# Patient Record
Sex: FEMALE | Race: OTHER | Employment: UNEMPLOYED
[De-identification: names, ages, dates, MRNs, and addresses within clinical notes are randomized per-mention and may not be internally consistent; named-entity substitution may affect disease eponyms.]

---

## 2024-03-14 ENCOUNTER — HOSPITAL ENCOUNTER (OUTPATIENT)
Facility: HOSPITAL | Age: 44
Discharge: HOME | End: 2024-03-14

## 2024-03-14 DIAGNOSIS — M54.50 CHRONIC MIDLINE LOW BACK PAIN WITHOUT SCIATICA: ICD-10-CM

## 2024-03-14 DIAGNOSIS — G89.29 CHRONIC MIDLINE LOW BACK PAIN WITHOUT SCIATICA: ICD-10-CM

## 2024-03-14 DIAGNOSIS — M54.2 NECK PAIN: ICD-10-CM

## 2024-03-14 PROCEDURE — 72050 X-RAY EXAM NECK SPINE 4/5VWS: CPT

## 2024-03-14 PROCEDURE — 72100 X-RAY EXAM L-S SPINE 2/3 VWS: CPT

## 2024-03-28 ENCOUNTER — OFFICE VISIT (OUTPATIENT)
Age: 44
End: 2024-03-28

## 2024-03-28 VITALS
OXYGEN SATURATION: 99 % | WEIGHT: 109 LBS | HEART RATE: 81 BPM | TEMPERATURE: 97.7 F | DIASTOLIC BLOOD PRESSURE: 50 MMHG | BODY MASS INDEX: 25.23 KG/M2 | SYSTOLIC BLOOD PRESSURE: 112 MMHG

## 2024-03-28 DIAGNOSIS — G89.29 CHRONIC MIDLINE LOW BACK PAIN WITHOUT SCIATICA: ICD-10-CM

## 2024-03-28 DIAGNOSIS — M54.50 CHRONIC MIDLINE LOW BACK PAIN WITHOUT SCIATICA: ICD-10-CM

## 2024-03-28 DIAGNOSIS — R22.1 MASS OF NECK: Primary | ICD-10-CM

## 2024-03-28 PROCEDURE — 99213 OFFICE O/P EST LOW 20 MIN: CPT | Performed by: NURSE PRACTITIONER

## 2024-03-28 SDOH — SOCIAL STABILITY: SOCIAL NETWORK: ARE YOU MARRIED, WIDOWED, DIVORCED, SEPARATED, NEVER MARRIED, OR LIVING WITH A PARTNER?: WIDOWED

## 2024-03-28 SDOH — HEALTH STABILITY: MENTAL HEALTH: HOW OFTEN DO YOU HAVE A DRINK CONTAINING ALCOHOL?: NEVER

## 2024-03-28 SDOH — ECONOMIC STABILITY: TRANSPORTATION INSECURITY
IN THE PAST 12 MONTHS, HAS THE LACK OF TRANSPORTATION KEPT YOU FROM MEDICAL APPOINTMENTS OR FROM GETTING MEDICATIONS?: NO

## 2024-03-28 SDOH — SOCIAL STABILITY: SOCIAL NETWORK: HOW OFTEN DO YOU ATTENT MEETINGS OF THE CLUB OR ORGANIZATION YOU BELONG TO?: NEVER

## 2024-03-28 SDOH — SOCIAL STABILITY: SOCIAL NETWORK
DO YOU BELONG TO ANY CLUBS OR ORGANIZATIONS SUCH AS CHURCH GROUPS UNIONS, FRATERNAL OR ATHLETIC GROUPS, OR SCHOOL GROUPS?: NO

## 2024-03-28 SDOH — SOCIAL STABILITY: SOCIAL NETWORK
IN A TYPICAL WEEK, HOW MANY TIMES DO YOU TALK ON THE PHONE WITH FAMILY, FRIENDS, OR NEIGHBORS?: MORE THAN THREE TIMES A WEEK

## 2024-03-28 SDOH — HEALTH STABILITY: MENTAL HEALTH
STRESS IS WHEN SOMEONE FEELS TENSE, NERVOUS, ANXIOUS, OR CAN'T SLEEP AT NIGHT BECAUSE THEIR MIND IS TROUBLED. HOW STRESSED ARE YOU?: ONLY A LITTLE

## 2024-03-28 SDOH — HEALTH STABILITY: MENTAL HEALTH: HOW MANY STANDARD DRINKS CONTAINING ALCOHOL DO YOU HAVE ON A TYPICAL DAY?: PATIENT DOES NOT DRINK

## 2024-03-28 SDOH — SOCIAL STABILITY: SOCIAL NETWORK: HOW OFTEN DO YOU GET TOGETHER WITH FRIENDS OR RELATIVES?: MORE THAN THREE TIMES A WEEK

## 2024-03-28 SDOH — ECONOMIC STABILITY: TRANSPORTATION INSECURITY
IN THE PAST 12 MONTHS, HAS LACK OF TRANSPORTATION KEPT YOU FROM MEETINGS, WORK, OR FROM GETTING THINGS NEEDED FOR DAILY LIVING?: NO

## 2024-03-28 SDOH — HEALTH STABILITY: PHYSICAL HEALTH: ON AVERAGE, HOW MANY DAYS PER WEEK DO YOU ENGAGE IN MODERATE TO STRENUOUS EXERCISE (LIKE A BRISK WALK)?: 0 DAYS

## 2024-03-28 SDOH — ECONOMIC STABILITY: INCOME INSECURITY: HOW HARD IS IT FOR YOU TO PAY FOR THE VERY BASICS LIKE FOOD, HOUSING, MEDICAL CARE, AND HEATING?: VERY HARD

## 2024-03-28 SDOH — SOCIAL STABILITY: SOCIAL NETWORK: HOW OFTEN DO YOU ATTEND CHURCH OR RELIGIOUS SERVICES?: 1 TO 4 TIMES PER YEAR

## 2024-03-28 SDOH — HEALTH STABILITY: PHYSICAL HEALTH: ON AVERAGE, HOW MANY MINUTES DO YOU ENGAGE IN EXERCISE AT THIS LEVEL?: 0 MIN

## 2024-03-28 ASSESSMENT — PATIENT HEALTH QUESTIONNAIRE - PHQ9
SUM OF ALL RESPONSES TO PHQ QUESTIONS 1-9: 2
2. FEELING DOWN, DEPRESSED OR HOPELESS: SEVERAL DAYS
SUM OF ALL RESPONSES TO PHQ QUESTIONS 1-9: 2
SUM OF ALL RESPONSES TO PHQ QUESTIONS 1-9: 2
SUM OF ALL RESPONSES TO PHQ9 QUESTIONS 1 & 2: 2
1. LITTLE INTEREST OR PLEASURE IN DOING THINGS: SEVERAL DAYS
SUM OF ALL RESPONSES TO PHQ QUESTIONS 1-9: 2

## 2024-03-28 NOTE — PROGRESS NOTES
\"Have you been to the ER, urgent care clinic since your last visit?  Hospitalized since your last visit?\"    NO    “Have you seen or consulted any other health care providers outside of [facility] since your last visit?”    NO     “Have you had a pap smear?”    NO    No cervical cancer screening on file         “Have you had a colorectal cancer screening such as a colonoscopy/FIT/Cologuard?    NO    No colonoscopy on file  No cologuard on file  No FIT/FOBT on file   No flexible sigmoidoscopy on file         Click Here for Release of Records Request

## 2024-03-28 NOTE — PROGRESS NOTES
Nikki Joya seen at d/c, full name and  verified, given After visit Summary and reviewed today's visit with patient along with instructions on when it is recommended to come back.  Patient was assisted in scheduling her imaging appointment for her Ultrasound: Thyroid ordered today.   Patient was explained that these services are NOT free. Patient was explained the Financial Assistance process and that she might receive a bill. Patient was explained that once bill is received that she is to set up an appt with our O.W to start on the financial assistance application that is based on the patient's income.

## 2024-03-28 NOTE — PROGRESS NOTES
3/28/2024 : Cornelia  Nikki Joya (: 1980) is a 44 y.o. female,  established patient, here for evaluation of the following chief complaint(s):  Results (Patient is here for test results.) and Extremity Weakness (Patient reports falling x2 due to numbness and weakness on right leg.)     ASSESSMENT/PLAN: ultrasound to be scheduled.  I Will consult with Dr. Holguin and we'll contact her.  Below is the assessment and plan developed based on review of pertinent history, physical exam, labs, studies, and medications.  1. Mass of neck  -     US HEAD NECK SOFT TISSUE THYROID; Future  2. Chronic midline low back pain without sciatica    Return for I am consulting with Dr. Holguin.  Diffuse degenerative changes with grade 1 anterolisthesis at C6-7.  Neural foraminal stenosis bilaterally at C3-4 and on the left at C4-5. (Per x-rays)  Degenerative disc and facet changes at L4-5 and L5-S1. (Per x-rays)  Numbness is always on the right side.  Everything is happening on the right side - losing hearing on the right side.  Has sound.  SUBJECTIVE/OBJECTIVE:  HPI Diffuse degenerative changes with grade 1 anterolisthesis at C6-7.  Neural foraminal stenosis bilaterally at C3-4 and on the left at C4-5.  She was feeling numbed in her legs.  2 days ago and when she tried to step up she fell down.    Diffuse degenerative changes with grade 1 anterolisthesis at C6-7.  Neural foraminal stenosis bilaterally at C3-4 and on the left at C4-5.  No results found for any visits on 24.  Current Medications:   Current Outpatient Medications on File Prior to Visit   Medication Sig Dispense Refill   • traZODone (DESYREL) 50 MG tablet Take 1 tablet by mouth nightly as needed for Sleep 30 tablet 2   • albuterol sulfate HFA (VENTOLIN HFA) 108 (90 Base) MCG/ACT inhaler Inhale 2 puffs into the lungs every 6 hours as needed for Wheezing or Shortness of Breath 18 g 1     No current facility-administered medications on file prior

## 2024-04-11 ENCOUNTER — HOSPITAL ENCOUNTER (OUTPATIENT)
Facility: HOSPITAL | Age: 44
Discharge: HOME | End: 2024-04-11

## 2024-04-11 DIAGNOSIS — R22.1 MASS OF NECK: ICD-10-CM

## 2024-04-11 PROCEDURE — 76536 US EXAM OF HEAD AND NECK: CPT

## 2024-04-16 NOTE — RESULT ENCOUNTER NOTE
The neck mass is a lipoma.  If it is interfering with daily activities, we can refer her to a surgeon.

## 2024-04-30 ENCOUNTER — OFFICE VISIT (OUTPATIENT)
Age: 44
End: 2024-04-30

## 2024-04-30 DIAGNOSIS — Z71.89 COUNSELING AND COORDINATION OF CARE: Primary | ICD-10-CM

## 2024-04-30 NOTE — PROGRESS NOTES
Assisted patient with medical bills. Application was filled out. A new appointment was made for 5/7/24 to complete application. Pending Bank statements and support letter.   Medicaid screening has been successfully completed.

## 2024-05-07 ENCOUNTER — OFFICE VISIT (OUTPATIENT)
Age: 44
End: 2024-05-07

## 2024-05-07 DIAGNOSIS — Z71.89 COUNSELING AND COORDINATION OF CARE: Primary | ICD-10-CM

## 2024-05-07 NOTE — PROGRESS NOTES
BS Financial Assistance application has been completed. [facility] notarized support letter for the patient.   Patient will bring it to a Financial Counselor at the hospital.

## 2025-04-24 ENCOUNTER — HOSPITAL ENCOUNTER (OUTPATIENT)
Facility: HOSPITAL | Age: 45
Setting detail: SPECIMEN
Discharge: HOME | End: 2025-04-27

## 2025-04-24 ENCOUNTER — TELEPHONE (OUTPATIENT)
Age: 45
End: 2025-04-24

## 2025-04-24 DIAGNOSIS — R13.14 PHARYNGOESOPHAGEAL DYSPHAGIA: ICD-10-CM

## 2025-04-24 DIAGNOSIS — Z13.9 SCREENING DUE: ICD-10-CM

## 2025-04-24 LAB
BASOPHILS # BLD: 0.04 K/UL (ref 0–0.1)
BASOPHILS NFR BLD: 0.5 % (ref 0–1)
DIFFERENTIAL METHOD BLD: NORMAL
EOSINOPHIL # BLD: 0.12 K/UL (ref 0–0.4)
EOSINOPHIL NFR BLD: 1.5 % (ref 0–7)
ERYTHROCYTE [DISTWIDTH] IN BLOOD BY AUTOMATED COUNT: 12.4 % (ref 11.5–14.5)
HCT VFR BLD AUTO: 39.1 % (ref 35–47)
HGB BLD-MCNC: 13 G/DL (ref 11.5–16)
IMM GRANULOCYTES # BLD AUTO: 0.03 K/UL (ref 0–0.04)
IMM GRANULOCYTES NFR BLD AUTO: 0.4 % (ref 0–0.5)
LYMPHOCYTES # BLD: 2.67 K/UL (ref 0.8–3.5)
LYMPHOCYTES NFR BLD: 32.8 % (ref 12–49)
MCH RBC QN AUTO: 30.3 PG (ref 26–34)
MCHC RBC AUTO-ENTMCNC: 33.2 G/DL (ref 30–36.5)
MCV RBC AUTO: 91.1 FL (ref 80–99)
MONOCYTES # BLD: 0.55 K/UL (ref 0–1)
MONOCYTES NFR BLD: 6.7 % (ref 5–13)
NEUTS SEG # BLD: 4.74 K/UL (ref 1.8–8)
NEUTS SEG NFR BLD: 58.1 % (ref 32–75)
NRBC # BLD: 0 K/UL (ref 0–0.01)
NRBC BLD-RTO: 0 PER 100 WBC
PLATELET # BLD AUTO: 327 K/UL (ref 150–400)
PMV BLD AUTO: 11.7 FL (ref 8.9–12.9)
RBC # BLD AUTO: 4.29 M/UL (ref 3.8–5.2)
WBC # BLD AUTO: 8.2 K/UL (ref 3.6–11)

## 2025-04-24 PROCEDURE — 36415 COLL VENOUS BLD VENIPUNCTURE: CPT

## 2025-04-24 PROCEDURE — 80053 COMPREHEN METABOLIC PANEL: CPT

## 2025-04-24 PROCEDURE — 83690 ASSAY OF LIPASE: CPT

## 2025-04-24 PROCEDURE — 85025 COMPLETE CBC W/AUTO DIFF WBC: CPT

## 2025-04-24 PROCEDURE — 80061 LIPID PANEL: CPT

## 2025-04-24 NOTE — TELEPHONE ENCOUNTER
Received call from Dr. Glez office who had patient with them in order to schedule appt. Was able to schedule appt with Dr. Golden for 05/08/2025, patient was also made aware of $100 self pay rate due at the time of the visit. Patient stated she will be applying for the care card once she receives bill from our office.

## 2025-04-24 NOTE — TELEPHONE ENCOUNTER
Placed outbound call to patient via  Services in regards to referral received. Patient did not answer, LVM requesting call back to schedule.

## 2025-04-25 ENCOUNTER — LAB (OUTPATIENT)
Age: 45
End: 2025-04-25

## 2025-04-25 ENCOUNTER — RESULTS FOLLOW-UP (OUTPATIENT)
Age: 45
End: 2025-04-25

## 2025-04-25 ENCOUNTER — HOSPITAL ENCOUNTER (OUTPATIENT)
Facility: HOSPITAL | Age: 45
Setting detail: SPECIMEN
Discharge: HOME | End: 2025-04-28

## 2025-04-25 DIAGNOSIS — K21.00 GASTROESOPHAGEAL REFLUX DISEASE WITH ESOPHAGITIS WITHOUT HEMORRHAGE: Primary | ICD-10-CM

## 2025-04-25 DIAGNOSIS — R13.10 DYSPHAGIA, UNSPECIFIED TYPE: ICD-10-CM

## 2025-04-25 DIAGNOSIS — K21.00 GASTROESOPHAGEAL REFLUX DISEASE WITH ESOPHAGITIS WITHOUT HEMORRHAGE: ICD-10-CM

## 2025-04-25 LAB
ALBUMIN SERPL-MCNC: 3.7 G/DL (ref 3.5–5)
ALBUMIN/GLOB SERPL: 0.9 (ref 1.1–2.2)
ALP SERPL-CCNC: 139 U/L (ref 45–117)
ALT SERPL-CCNC: 38 U/L (ref 12–78)
ANION GAP SERPL CALC-SCNC: 6 MMOL/L (ref 2–12)
AST SERPL-CCNC: 23 U/L (ref 15–37)
BILIRUB SERPL-MCNC: 0.2 MG/DL (ref 0.2–1)
BUN SERPL-MCNC: 11 MG/DL (ref 6–20)
BUN/CREAT SERPL: 21 (ref 12–20)
CALCIUM SERPL-MCNC: 9.6 MG/DL (ref 8.5–10.1)
CHLORIDE SERPL-SCNC: 105 MMOL/L (ref 97–108)
CHOLEST SERPL-MCNC: 214 MG/DL
CO2 SERPL-SCNC: 28 MMOL/L (ref 21–32)
CREAT SERPL-MCNC: 0.52 MG/DL (ref 0.55–1.02)
GLOBULIN SER CALC-MCNC: 3.9 G/DL (ref 2–4)
GLUCOSE SERPL-MCNC: 98 MG/DL (ref 65–100)
HDLC SERPL-MCNC: 51 MG/DL
HDLC SERPL: 4.2 (ref 0–5)
LDLC SERPL CALC-MCNC: 124.8 MG/DL (ref 0–100)
LIPASE SERPL-CCNC: 38 U/L (ref 13–75)
POTASSIUM SERPL-SCNC: 4.2 MMOL/L (ref 3.5–5.1)
PROT SERPL-MCNC: 7.6 G/DL (ref 6.4–8.2)
SODIUM SERPL-SCNC: 139 MMOL/L (ref 136–145)
TRIGL SERPL-MCNC: 191 MG/DL
VLDLC SERPL CALC-MCNC: 38.2 MG/DL

## 2025-04-25 PROCEDURE — 87338 HPYLORI STOOL AG IA: CPT

## 2025-04-25 NOTE — RESULT ENCOUNTER NOTE
Please let the pt know that her LDL cholesterol and triglycerides are a little above normal.  She does not need medication but It is important that she focus on a healthy diet - choosing mostly vegetables, lean meats (like chicken or fish) and whole grains.  He should limit sugars/sweets/sodas and simple carbs like white bread, rice, tortillas and pasta.  Avoid fried and greasy foods.    The rest of her labs are normal.  Normal liver, kidney and pancreas function.  Normal blood cells.

## 2025-04-30 ENCOUNTER — RESULTS FOLLOW-UP (OUTPATIENT)
Age: 45
End: 2025-04-30

## 2025-04-30 ENCOUNTER — PROCEDURE VISIT (OUTPATIENT)
Age: 45
End: 2025-04-30

## 2025-04-30 DIAGNOSIS — H90.3 SENSORINEURAL HEARING LOSS (SNHL), BILATERAL: Primary | ICD-10-CM

## 2025-04-30 DIAGNOSIS — H93.13 TINNITUS, BILATERAL: ICD-10-CM

## 2025-04-30 LAB
H PYLORI AG STL QL IA: NEGATIVE
SPECIMEN SOURCE: NORMAL

## 2025-04-30 PROCEDURE — 92557 COMPREHENSIVE HEARING TEST: CPT

## 2025-04-30 PROCEDURE — 92567 TYMPANOMETRY: CPT

## 2025-04-30 NOTE — PATIENT INSTRUCTIONS
the word \"Thursday\" but not the rest of the week, you may ask \"What was that about Thursday?\" or \"What did you want to do Thursday?\".  This shows the person talking that you are listening and will help them better explain what they are saying.  Be an advocate for yourself.  If you are hearing but not understanding, tell the other person \"I can hear you, but I need you to slow down when you speak.\"  Or if someone is facing the other direction, say \"I cannot hear you when you are not looking at me when we talk.\"       Tips as a Talker:   - Sit or stand 3 to 6 feet away to maximize audibility         -- It is unrealistic to believe someone else will fully hear your message if you are speaking from across the room or in a different room in the house   - Stay at eye level to help with visual cues   - Make sure you have the person’s attention before speaking   - Use facial expressions and gestures to accentuate your message   - Raise your voice slightly (do not scream)   - Speak slowly and distinctly   - Use short, simple sentences   - Rephrase your words if the person is having a hard time understanding you    - To avoid distortion, don’t speak directly into a person’s ear      Some additional items that may be helpful:   - Remain patient - this is important for both parties   - Write down items that still cannot be heard/understood.  You may write with pen/paper or consider typing/texting on a cell phone or smart device.   - If background noise is unavoidable, try to keep yourself in a good position in the room.  By sitting at a fofana on the side of the restaurant (preferably a corner), it will be easier to communicate than if you were sitting at a table in the middle with background noise surrounding you.  Keep yourself positioned away from music speakers or heavy foot traffic.

## 2025-04-30 NOTE — RESULT ENCOUNTER NOTE
Please let the pt know that her H.Pylori test is negative.  If she is still having reflux symptoms I can send pantoprazole.

## 2025-04-30 NOTE — PROGRESS NOTES
Nikki Joya   1980, 45 y.o. female   [id number]       AUDIOLOGIC EVALUATION      Nikki Joya is a 45 y.o. female seen today, 4/30/2025, for an audiologic evaluation. This appointment was completed with interpretation.    PERTINENT MEDICAL HISTORY:      Nikki Joya reports constant, bilateral tinnitus, louder in the right ear, over the past 3-4 years. She notes it makes communication difficult. She reports having pain and otorrhea approximately one year ago that has since subsided. She notes she had an accident involving her right ear in 2006 and is worried that tympanic membrane damage is contributing to her tinnitus.    She denied dizziness    IMPRESSIONS:      Today's results revealed mild sloping to moderate/moderately-severe sensorineural hearing loss. Tympanometry indicates normal movement of the tympanic membrane, consistent with middle ear function.    Follow medical recommendations of primary care physician and referring provider.    ASSESSMENT AND FINDINGS:     Otoscopy unremarkable.    RIGHT EAR:  Hearing Sensitivity: Mild sloping to moderately-severe sensorineural hearing loss. Asymmetric hearing with the right ear poorer than the left ear from 4000 to 8000 Hz  Speech Detection Threshold: 40 dB HL  Word Recognition: Due to interpretation issue/miscommunication, this test was not adequately completed  Tympanometry: Type A, consistent with normal middle ear function      LEFT EAR:  Hearing Sensitivity: Mild sloping to moderate sensorineural hearing loss  Speech Detection Threshold: 40 dB HL  Word Recognition: Due to interpretation issue/miscommunication, this test was not adequately completed  Tympanometry: Type A, consistent with normal middle ear function      Reliability: Fair (Could not mask due to interpretation issue)  Transducer: Inserts    See scanned audiogram dated 4/30/2025 for results.        PATIENT EDUCATION:     The following items were

## 2025-05-06 ENCOUNTER — HOSPITAL ENCOUNTER (OUTPATIENT)
Facility: HOSPITAL | Age: 45
Discharge: HOME | End: 2025-05-09
Attending: FAMILY MEDICINE

## 2025-05-06 DIAGNOSIS — K21.00 GASTROESOPHAGEAL REFLUX DISEASE WITH ESOPHAGITIS WITHOUT HEMORRHAGE: ICD-10-CM

## 2025-05-06 DIAGNOSIS — R13.10 DYSPHAGIA, UNSPECIFIED TYPE: ICD-10-CM

## 2025-05-06 PROCEDURE — 74221 X-RAY XM ESOPHAGUS 2CNTRST: CPT

## 2025-05-07 ENCOUNTER — RESULTS FOLLOW-UP (OUTPATIENT)
Age: 45
End: 2025-05-07

## 2025-05-07 DIAGNOSIS — K22.89 ESOPHAGEAL MASS: ICD-10-CM

## 2025-05-07 DIAGNOSIS — R13.10 DYSPHAGIA, UNSPECIFIED TYPE: Primary | ICD-10-CM

## 2025-05-07 NOTE — RESULT ENCOUNTER NOTE
Spoke with patient and reviewed Esophagram.  Shows mass which needs direct visualization.  Discussed starting process to see GI through [facility], will make referral.  Geraldo Holguin MD

## 2025-05-08 ENCOUNTER — OFFICE VISIT (OUTPATIENT)
Age: 45
End: 2025-05-08

## 2025-05-08 VITALS
HEIGHT: 60 IN | TEMPERATURE: 98.3 F | RESPIRATION RATE: 18 BRPM | SYSTOLIC BLOOD PRESSURE: 110 MMHG | DIASTOLIC BLOOD PRESSURE: 69 MMHG | HEART RATE: 81 BPM | WEIGHT: 105.4 LBS | OXYGEN SATURATION: 96 % | BODY MASS INDEX: 20.69 KG/M2

## 2025-05-08 DIAGNOSIS — D17.0 LIPOMA OF NECK: Primary | ICD-10-CM

## 2025-05-08 PROCEDURE — 99203 OFFICE O/P NEW LOW 30 MIN: CPT | Performed by: SURGERY

## 2025-05-08 RX ORDER — SODIUM CHLORIDE 0.9 % (FLUSH) 0.9 %
5-40 SYRINGE (ML) INJECTION EVERY 12 HOURS SCHEDULED
OUTPATIENT
Start: 2025-05-08

## 2025-05-08 RX ORDER — SODIUM CHLORIDE 9 MG/ML
INJECTION, SOLUTION INTRAVENOUS PRN
OUTPATIENT
Start: 2025-05-08

## 2025-05-08 RX ORDER — SODIUM CHLORIDE 0.9 % (FLUSH) 0.9 %
5-40 SYRINGE (ML) INJECTION PRN
OUTPATIENT
Start: 2025-05-08

## 2025-05-08 ASSESSMENT — PATIENT HEALTH QUESTIONNAIRE - PHQ9
SUM OF ALL RESPONSES TO PHQ QUESTIONS 1-9: 1
2. FEELING DOWN, DEPRESSED OR HOPELESS: SEVERAL DAYS
1. LITTLE INTEREST OR PLEASURE IN DOING THINGS: NOT AT ALL
SUM OF ALL RESPONSES TO PHQ QUESTIONS 1-9: 1

## 2025-05-08 NOTE — PROGRESS NOTES
Identified pt with two pt identifiers (name and ). Reviewed chart in preparation for visit and have obtained necessary documentation.    [facility] Language Services    Session Code:    Language:Telugu     Name:Jess     ID:[id number]      Nikki Joya is a 45 y.o. female  Chief Complaint   Patient presents with    New Patient    Mass     Lipoma of the neck     /69 (BP Site: Left Upper Arm, Patient Position: Sitting, BP Cuff Size: Small Adult)   Pulse 81   Temp 98.3 °F (36.8 °C) (Oral)   Resp 18   Ht 1.52 m (4' 11.84\")   Wt 47.8 kg (105 lb 6.4 oz)   SpO2 96%   BMI 20.69 kg/m²     1. Have you been to the ER, urgent care clinic since your last visit?  Hospitalized since your last visit?no    2. Have you seen or consulted any other health care providers outside of the [facility] since your last visit?  Include any pap smears or colon screening. no

## 2025-05-08 NOTE — PROGRESS NOTES
Surgery Progress Note    5/8/2025    had concerns including New Patient and Mass (Lipoma of the neck).     Subjective:     Patient is a 45 y.o. female who reports she has had a soft mass on the right back neck area for many years.  It has been increasing slowly in size.  Denies the area ever getting infected, denies drainage.  The mass is now a significant size and she would like to have it removed.  Denies any similar masses anywhere else.      Constitutional: No fever or chills  Neurologic: No headache  Eyes: No scleral icterus or irritated eyes  Nose: No nasal pain or drainage  Mouth: No oral lesions or sore throat  Cardiac: No palpations or chest pain  Pulmonary: No cough or shortness of breath  Gastrointestinal: No nausea, emesis, diarrhea, or constipation  Genitourinary: No dysuria  Musculoskeletal: No muscle or joint tenderness  Skin: No rashes or lesions  Psychiatric: No anxiety or depressed mood    Current Outpatient Medications   Medication Instructions    albuterol sulfate HFA (VENTOLIN HFA) 108 (90 Base) MCG/ACT inhaler 2 puffs, Inhalation, EVERY 6 HOURS PRN    traZODone (DESYREL) 50 mg, Oral, NIGHTLY PRN       Allergies   Allergen Reactions    Penicillins Rash        Past Medical History:   Diagnosis Date    Asthma     Back pain     GERD (gastroesophageal reflux disease)         No past surgical history on file.     Social History     Socioeconomic History    Marital status: Single   Tobacco Use    Smoking status: Never     Passive exposure: Never    Smokeless tobacco: Never   Substance and Sexual Activity    Alcohol use: Never    Drug use: Never     Social Drivers of Health     Financial Resource Strain: High Risk (3/28/2024)    Overall Financial Resource Strain (CARDIA)     Difficulty of Paying Living Expenses: Very hard   Food Insecurity: No Food Insecurity (4/24/2025)    Hunger Vital Sign     Worried About Running Out of Food in the Last Year: Never true     Ran Out of Food in the Last Year: Never

## 2025-05-09 ENCOUNTER — TELEPHONE (OUTPATIENT)
Age: 45
End: 2025-05-09

## 2025-05-09 NOTE — TELEPHONE ENCOUNTER
Called PT via  services, no answer. LVM via  services for PT to call back and schedule surgery with Dr. Golden.

## 2025-05-12 ENCOUNTER — PREP FOR PROCEDURE (OUTPATIENT)
Age: 45
End: 2025-05-12

## 2025-05-12 DIAGNOSIS — D17.0 LIPOMA OF NECK: ICD-10-CM

## 2025-05-14 ENCOUNTER — TELEPHONE (OUTPATIENT)
Age: 45
End: 2025-05-14

## 2025-05-14 NOTE — TELEPHONE ENCOUNTER
Outbound call to patient using . Patient's name and  was verified. Per Sandro's request, the appointment for  @ 9am was rescheduled to 1pm with Charles Fletcher. Patient was reminded this was for a post op and not a procedure. Patient understood.

## 2025-05-19 ENCOUNTER — HOSPITAL ENCOUNTER (OUTPATIENT)
Facility: HOSPITAL | Age: 45
Setting detail: OUTPATIENT SURGERY
Discharge: HOME | End: 2025-05-19
Attending: SURGERY | Admitting: SURGERY

## 2025-05-19 ENCOUNTER — ANESTHESIA EVENT (OUTPATIENT)
Facility: HOSPITAL | Age: 45
End: 2025-05-19

## 2025-05-19 ENCOUNTER — ANESTHESIA (OUTPATIENT)
Facility: HOSPITAL | Age: 45
End: 2025-05-19

## 2025-05-19 VITALS
RESPIRATION RATE: 16 BRPM | HEART RATE: 80 BPM | WEIGHT: 105.16 LBS | OXYGEN SATURATION: 100 % | DIASTOLIC BLOOD PRESSURE: 72 MMHG | BODY MASS INDEX: 20.65 KG/M2 | TEMPERATURE: 98.5 F | SYSTOLIC BLOOD PRESSURE: 121 MMHG

## 2025-05-19 PROCEDURE — 3600000002 HC SURGERY LEVEL 2 BASE: Performed by: SURGERY

## 2025-05-19 PROCEDURE — 3700000001 HC ADD 15 MINUTES (ANESTHESIA): Performed by: SURGERY

## 2025-05-19 PROCEDURE — 3700000000 HC ANESTHESIA ATTENDED CARE: Performed by: SURGERY

## 2025-05-19 PROCEDURE — 3600000012 HC SURGERY LEVEL 2 ADDTL 15MIN: Performed by: SURGERY

## 2025-05-19 PROCEDURE — 6360000002 HC RX W HCPCS: Performed by: NURSE ANESTHETIST, CERTIFIED REGISTERED

## 2025-05-19 PROCEDURE — 7100000010 HC PHASE II RECOVERY - FIRST 15 MIN: Performed by: SURGERY

## 2025-05-19 PROCEDURE — 2709999900 HC NON-CHARGEABLE SUPPLY: Performed by: SURGERY

## 2025-05-19 PROCEDURE — 7100000000 HC PACU RECOVERY - FIRST 15 MIN: Performed by: SURGERY

## 2025-05-19 PROCEDURE — 7100000011 HC PHASE II RECOVERY - ADDTL 15 MIN: Performed by: SURGERY

## 2025-05-19 PROCEDURE — 21554 EXC NECK TUM DEEP 5 CM/>: CPT | Performed by: SURGERY

## 2025-05-19 PROCEDURE — 2500000003 HC RX 250 WO HCPCS: Performed by: NURSE ANESTHETIST, CERTIFIED REGISTERED

## 2025-05-19 PROCEDURE — 88304 TISSUE EXAM BY PATHOLOGIST: CPT

## 2025-05-19 PROCEDURE — 6360000002 HC RX W HCPCS: Performed by: SURGERY

## 2025-05-19 PROCEDURE — 2580000003 HC RX 258: Performed by: STUDENT IN AN ORGANIZED HEALTH CARE EDUCATION/TRAINING PROGRAM

## 2025-05-19 RX ORDER — ONDANSETRON 2 MG/ML
4 INJECTION INTRAMUSCULAR; INTRAVENOUS
Status: DISCONTINUED | OUTPATIENT
Start: 2025-05-19 | End: 2025-05-19 | Stop reason: HOSPADM

## 2025-05-19 RX ORDER — SODIUM CHLORIDE 0.9 % (FLUSH) 0.9 %
5-40 SYRINGE (ML) INJECTION EVERY 12 HOURS SCHEDULED
Status: DISCONTINUED | OUTPATIENT
Start: 2025-05-19 | End: 2025-05-19 | Stop reason: HOSPADM

## 2025-05-19 RX ORDER — DIPHENHYDRAMINE HYDROCHLORIDE 50 MG/ML
12.5 INJECTION, SOLUTION INTRAMUSCULAR; INTRAVENOUS
Status: DISCONTINUED | OUTPATIENT
Start: 2025-05-19 | End: 2025-05-19 | Stop reason: HOSPADM

## 2025-05-19 RX ORDER — FENTANYL CITRATE 50 UG/ML
INJECTION, SOLUTION INTRAMUSCULAR; INTRAVENOUS
Status: DISCONTINUED | OUTPATIENT
Start: 2025-05-19 | End: 2025-05-19 | Stop reason: SDUPTHER

## 2025-05-19 RX ORDER — EPHEDRINE SULFATE/0.9% NACL/PF 25 MG/5 ML
SYRINGE (ML) INTRAVENOUS
Status: DISCONTINUED | OUTPATIENT
Start: 2025-05-19 | End: 2025-05-19 | Stop reason: SDUPTHER

## 2025-05-19 RX ORDER — IBUPROFEN 800 MG/1
800 TABLET, FILM COATED ORAL EVERY 6 HOURS PRN
COMMUNITY

## 2025-05-19 RX ORDER — MIDAZOLAM HYDROCHLORIDE 1 MG/ML
INJECTION, SOLUTION INTRAMUSCULAR; INTRAVENOUS
Status: DISCONTINUED | OUTPATIENT
Start: 2025-05-19 | End: 2025-05-19 | Stop reason: SDUPTHER

## 2025-05-19 RX ORDER — NALOXONE HYDROCHLORIDE 0.4 MG/ML
INJECTION, SOLUTION INTRAMUSCULAR; INTRAVENOUS; SUBCUTANEOUS PRN
Status: DISCONTINUED | OUTPATIENT
Start: 2025-05-19 | End: 2025-05-19 | Stop reason: HOSPADM

## 2025-05-19 RX ORDER — MIDAZOLAM HYDROCHLORIDE 2 MG/2ML
2 INJECTION, SOLUTION INTRAMUSCULAR; INTRAVENOUS
Status: DISCONTINUED | OUTPATIENT
Start: 2025-05-19 | End: 2025-05-19 | Stop reason: HOSPADM

## 2025-05-19 RX ORDER — FENTANYL CITRATE 50 UG/ML
100 INJECTION, SOLUTION INTRAMUSCULAR; INTRAVENOUS
Status: DISCONTINUED | OUTPATIENT
Start: 2025-05-19 | End: 2025-05-19 | Stop reason: HOSPADM

## 2025-05-19 RX ORDER — SODIUM CHLORIDE 9 MG/ML
INJECTION, SOLUTION INTRAVENOUS PRN
Status: DISCONTINUED | OUTPATIENT
Start: 2025-05-19 | End: 2025-05-19 | Stop reason: HOSPADM

## 2025-05-19 RX ORDER — SODIUM CHLORIDE, SODIUM LACTATE, POTASSIUM CHLORIDE, CALCIUM CHLORIDE 600; 310; 30; 20 MG/100ML; MG/100ML; MG/100ML; MG/100ML
INJECTION, SOLUTION INTRAVENOUS CONTINUOUS
Status: DISCONTINUED | OUTPATIENT
Start: 2025-05-19 | End: 2025-05-19 | Stop reason: HOSPADM

## 2025-05-19 RX ORDER — PROPOFOL 10 MG/ML
INJECTION, EMULSION INTRAVENOUS
Status: DISCONTINUED | OUTPATIENT
Start: 2025-05-19 | End: 2025-05-19 | Stop reason: SDUPTHER

## 2025-05-19 RX ORDER — SODIUM CHLORIDE 0.9 % (FLUSH) 0.9 %
5-40 SYRINGE (ML) INJECTION PRN
Status: DISCONTINUED | OUTPATIENT
Start: 2025-05-19 | End: 2025-05-19 | Stop reason: HOSPADM

## 2025-05-19 RX ORDER — LIDOCAINE HYDROCHLORIDE 10 MG/ML
1 INJECTION, SOLUTION EPIDURAL; INFILTRATION; INTRACAUDAL; PERINEURAL
Status: DISCONTINUED | OUTPATIENT
Start: 2025-05-19 | End: 2025-05-19 | Stop reason: HOSPADM

## 2025-05-19 RX ADMIN — PROPOFOL 30 MG: 10 INJECTION, EMULSION INTRAVENOUS at 09:32

## 2025-05-19 RX ADMIN — PROPOFOL 100 MCG/KG/MIN: 10 INJECTION, EMULSION INTRAVENOUS at 09:33

## 2025-05-19 RX ADMIN — FENTANYL CITRATE 50 MCG: 50 INJECTION, SOLUTION INTRAMUSCULAR; INTRAVENOUS at 09:32

## 2025-05-19 RX ADMIN — EPHEDRINE SULFATE 5 MG: 5 INJECTION INTRAVENOUS at 09:37

## 2025-05-19 RX ADMIN — FENTANYL CITRATE 50 MCG: 50 INJECTION, SOLUTION INTRAMUSCULAR; INTRAVENOUS at 09:27

## 2025-05-19 RX ADMIN — MIDAZOLAM HYDROCHLORIDE 2 MG: 1 INJECTION, SOLUTION INTRAMUSCULAR; INTRAVENOUS at 09:27

## 2025-05-19 RX ADMIN — SODIUM CHLORIDE, POTASSIUM CHLORIDE, SODIUM LACTATE AND CALCIUM CHLORIDE: 600; 310; 30; 20 INJECTION, SOLUTION INTRAVENOUS at 09:07

## 2025-05-19 ASSESSMENT — PAIN - FUNCTIONAL ASSESSMENT: PAIN_FUNCTIONAL_ASSESSMENT: 0-10

## 2025-05-19 NOTE — OP NOTE
OPERATIVE REPORT - Excision back neck lipoma    PREOPERATIVE DIAGNOSIS: back neck lipoma    POSTOPERATIVE DIAGNOSIS: Same    OPERATIVE PROCEDURE:  Excision back neck lipoma      SURGEON: Zo Golden MD    ANESTHESIA: MAC with local    COMPLICATIONS: None    ESTIMATED BLOOD LOSS: Minimal.    INDICATION: Documented in the history and physical.    FINDINGS: 4cm x 5cm back neck lipoma    PROCEDURE:   The patient was taken to the operating room and placed in the left side down position and following MAC    the back neck was prepped and draped in the usual sterile fashion.  Local anesthetic was injected to the area.   A transverese incision was made over the area where mass was palpated.  This was carried down the subcutaneous tissues with a bovie cautery.  Mass was grapsed and using cautery and retraction, the mass was excised.  Hemostasis obtained using cautery. 4cm x 5cm mass excised.     Deep dermal stitches were taken with 3-0 Vicryl.   The skin was approximated with a running 4-0 Vicryl subcuticular suture. Dermabond was applied as a dressing. The patient tolerated the procedure well, and transferred to the recovery room in satisfactory condition.        SPECIMEN:back neck lipoma    COUNTS: Correct at the completion of surgery.    DRAIN: NONE    Zo Golden MD

## 2025-05-19 NOTE — ANESTHESIA POSTPROCEDURE EVALUATION
Department of Anesthesiology  Postprocedure Note    Patient: Nikki Joya  MRN: [id number]  YOB: 1980  Date of evaluation: 5/19/2025    Procedure Summary       Date: 05/19/25 Room / Location: [facility] MAIN OR 6 / [facility] MAIN OR    Anesthesia Start: 0927 Anesthesia Stop: 1018    Procedure: EXCISION BACK NECK LIPOMA (MAC/LOCAL) (Neck) Diagnosis:       Lipoma of neck      (Lipoma of neck [D17.0])    Surgeons: Zo Golden MD Responsible Provider: Alexa Anderson MD    Anesthesia Type: MAC ASA Status: 2            Anesthesia Type: No value filed.    Elizabeth Phase I: Elizabeth Score: 9    Elizabeth Phase II: Elizabeth Score: 10    Anesthesia Post Evaluation    Patient location during evaluation: PACU  Patient participation: complete - patient participated  Level of consciousness: awake  Airway patency: patent  Nausea & Vomiting: no vomiting and no nausea  Cardiovascular status: hemodynamically stable  Respiratory status: acceptable  Hydration status: stable  Pain management: adequate    No notable events documented.

## 2025-05-19 NOTE — BRIEF OP NOTE
Brief Postoperative Note      Patient: Nikki Joya  YOB: 1980  MRN: [id number]    Date of Procedure: 5/19/2025    Pre-Op Diagnosis Codes:      * Lipoma of neck [D17.0]    Post-Op Diagnosis: Same       Procedure(s):  EXCISION BACK NECK LIPOMA (MAC/LOCAL)    Surgeon(s):  Zo Golden MD    Assistant:  Surgical Assistant: Jaqueline Dias; Can Presley    Anesthesia: Monitor Anesthesia Care    Estimated Blood Loss (mL): Minimal    Complications: None    Specimens:   ID Type Source Tests Collected by Time Destination   1 : Neck lipoma Tissue Neck SURGICAL PATHOLOGY Zo Golden MD 5/19/2025 0954        Implants:  none      Drains:none    Findings:  Infection Present At Time Of Surgery (PATOS) (choose all levels that have infection present):  No infection present  Other Findings: 4cm x 5cm back neck lipoma  This procedure was not performed to treat primary cutaneous melanoma through wide local excision    Electronically signed by Zo Golden MD on 5/19/2025 at 10:08 AM

## 2025-05-19 NOTE — ANESTHESIA PRE PROCEDURE
Department of Anesthesiology  Preprocedure Note       Name:  Nikki Joya   Age:  45 y.o.  :  1980                                          MRN:  [id number]         Date:  2025      Surgeon: Surgeon(s):  Zo Golden MD    Procedure: Procedure(s):  EXCISION BACK NECK LIPOMA (MAC/LOCAL)    Medications prior to admission:   Prior to Admission medications    Medication Sig Start Date End Date Taking? Authorizing Provider   ibuprofen (ADVIL;MOTRIN) 800 MG tablet Take 1 tablet by mouth every 6 hours as needed for Pain   Yes Provider, MD Denise   traZODone (DESYREL) 50 MG tablet Take 1 tablet by mouth nightly as needed for Sleep 24  Yes Nakul Crow MD   albuterol sulfate HFA (VENTOLIN HFA) 108 (90 Base) MCG/ACT inhaler Inhale 2 puffs into the lungs every 6 hours as needed for Wheezing or Shortness of Breath 24  Yes Nakul Crow MD       Current medications:    Current Facility-Administered Medications   Medication Dose Route Frequency Provider Last Rate Last Admin   • lidocaine PF 1 % injection 1 mL  1 mL IntraDERmal Once PRN Rui Carver MD       • fentaNYL (SUBLIMAZE) injection 100 mcg  100 mcg IntraVENous Once PRN Rui Carver MD       • lactated ringers infusion   IntraVENous Continuous Rui Carver MD       • midazolam PF (VERSED) injection 2 mg  2 mg IntraVENous Once PRN Rui Carver MD       • sodium chloride flush 0.9 % injection 5-40 mL  5-40 mL IntraVENous 2 times per day Zo Golden MD       • sodium chloride flush 0.9 % injection 5-40 mL  5-40 mL IntraVENous PRN Zo Golden MD       • 0.9 % sodium chloride infusion   IntraVENous PRN Zo Golden MD           Allergies:    Allergies   Allergen Reactions   • Penicillins Rash       Problem List:    Patient Active Problem List   Diagnosis Code   • Lipoma of neck D17.0       Past Medical History:        Diagnosis Date   • Asthma    • Back pain    • GERD

## 2025-05-19 NOTE — DISCHARGE INSTRUCTIONS
with breakfast  10 am: 1000mg acetaminophen/Tylenol  1am: 600-800mg with lunch  4pm: 1000mg acetaminophen/Tylenol  7pm:  600-800mg ibuprofen with dinner  10am: 1000mg acetaminophen/Tylenol before bed    DISCHARGE SUMMARY from your Nurse    PATIENT INSTRUCTIONS  After general anesthesia or intravenous sedation, for 24 hours or while taking prescription Narcotics:  Limit your activities  Do not drive and operate hazardous machinery  Do not make important personal or business decisions  Do  not drink alcoholic beverages  If you have not urinated within 8 hours after discharge, please contact your surgeon on call.  Report the following to your surgeon:  Excessive pain, swelling, redness or odor of or around the surgical area  Temperature over 100.5  Nausea and vomiting lasting longer than 4 hours or if unable to take medications  Any signs of decreased circulation or nerve impairment to extremity: change in color, persistent  numbness, tingling, coldness or increase pain  Any questions    GOOD HELP TO FIGHT AN INFECTION  Here are a few tip to help reduce the chance of getting an infection after surgery:  Wash Your Hands  Good handwashing is the most important thing you and your caregiver can do.  Wash before and after caring for any wounds.  Dry your hand with a clean towel.  Wash with soap and water for at least 20 seconds. A TIP: sing the \"Happy Birthday\" song through one time while washing to help with the timing.  Use a hand  in between washings.  Shower  When your surgeon says it is OK to take a shower, use a new bar of antibacterial soap (if that is what you use, and keep that bar of soap ONLY for your use), or antibacterial body wash.  Use a clean wash cloth or sponge when you bathe.  Dry off with a clean towel  after every bath - be careful around any wounds, skin staples, sutures or surgical glue over/on wounds.  Do not enter swimming pools, hot tubs, lakes, rivers and/or ocean until wounds are healed

## 2025-05-28 ENCOUNTER — TELEPHONE (OUTPATIENT)
Age: 45
End: 2025-05-28

## 2025-05-29 ENCOUNTER — OFFICE VISIT (OUTPATIENT)
Age: 45
End: 2025-05-29

## 2025-05-29 VITALS
SYSTOLIC BLOOD PRESSURE: 107 MMHG | HEART RATE: 77 BPM | BODY MASS INDEX: 21.41 KG/M2 | HEIGHT: 59 IN | WEIGHT: 106.2 LBS | DIASTOLIC BLOOD PRESSURE: 67 MMHG | OXYGEN SATURATION: 96 % | TEMPERATURE: 98.2 F

## 2025-05-29 DIAGNOSIS — Z98.890 STATUS POST SURGERY: Primary | ICD-10-CM

## 2025-05-29 PROCEDURE — 99024 POSTOP FOLLOW-UP VISIT: CPT | Performed by: PHYSICIAN ASSISTANT

## 2025-05-29 ASSESSMENT — PATIENT HEALTH QUESTIONNAIRE - PHQ9
SUM OF ALL RESPONSES TO PHQ QUESTIONS 1-9: 3
2. FEELING DOWN, DEPRESSED OR HOPELESS: NEARLY EVERY DAY
SUM OF ALL RESPONSES TO PHQ QUESTIONS 1-9: 3
SUM OF ALL RESPONSES TO PHQ QUESTIONS 1-9: 3
1. LITTLE INTEREST OR PLEASURE IN DOING THINGS: NOT AT ALL
SUM OF ALL RESPONSES TO PHQ QUESTIONS 1-9: 3

## 2025-05-29 NOTE — PROGRESS NOTES
Surgery Progress Note    5/29/2025    had concerns including Post-Op Check (Excision of posterior neck 5/19/25).     Subjective:     Patient is a 45 y.o. female who is s/p neck lipoma excision about 2 weeks ago with Dr Golden / Oumar. They are doing well with no complaints. No bleeding or drainage. No fevers or chills.        Past Medical History:   Diagnosis Date    Asthma     Back pain     GERD (gastroesophageal reflux disease)         Past Surgical History:   Procedure Laterality Date    BACK SURGERY N/A 5/19/2025    EXCISION BACK NECK LIPOMA (MAC/LOCAL) performed by Zo Golden MD at [facility] MAIN OR          Objective:     Vitals:    05/29/25 1342   BP: 107/67   Pulse: 77   Temp: 98.2 °F (36.8 °C)   SpO2: 96%     Body mass index is 21.45 kg/m².  Wt Readings from Last 3 Encounters:   05/29/25 48.2 kg (106 lb 3.2 oz)   05/19/25 47.7 kg (105 lb 2.6 oz)   05/08/25 47.8 kg (105 lb 6.4 oz)        General: No acute distress, conversant  Eyes: PERRLA, no scleral icterus  HENT: Normocephalic, PEERLA  GI: Soft, NT, ND, incisions clean, dry, intact  Skin: Healing incision on neck, some mild appropriate inflammatory changes  Psych: Appropriate mood and affect      Assessment / Plan:       45 y.o. y/o female   Pathology reviewed  Satisfactory post-op course  Will return on a PRN basis      JIM Hernandez [facility]

## 2025-05-29 NOTE — PROGRESS NOTES
Identified pt with two pt identifiers (name and ). Reviewed chart in preparation for visit and have obtained necessary documentation.    Nikki Joya is a 45 y.o. female  Chief Complaint   Patient presents with    Post-Op Check     Excision of posterior neck 25     /67 (BP Site: Left Upper Arm, Patient Position: Sitting, BP Cuff Size: Small Adult)   Pulse 77   Temp 98.2 °F (36.8 °C) (Oral)   Ht 1.499 m (4' 11\")   Wt 48.2 kg (106 lb 3.2 oz)   LMP 2025 (Approximate)   SpO2 96%   BMI 21.45 kg/m²     1. Have you been to the ER, urgent care clinic since your last visit?  Hospitalized since your last visit?yes - [facility] arm fracture    2. Have you seen or consulted any other health care providers outside of the [facility] since your last visit?  Include any pap smears or colon screening. no     Due to language barrier, an  was present virtually during the history-taking and subsequent discussion (and for part of the physical exam) with this patient.  Tony #[id number].

## 2025-06-19 ENCOUNTER — LAB (OUTPATIENT)
Age: 45
End: 2025-06-19

## 2025-06-19 ENCOUNTER — HOSPITAL ENCOUNTER (OUTPATIENT)
Facility: HOSPITAL | Age: 45
Setting detail: SPECIMEN
Discharge: HOME | End: 2025-06-22

## 2025-06-19 DIAGNOSIS — Z12.11 COLON CANCER SCREENING: ICD-10-CM

## 2025-06-19 PROCEDURE — 82274 ASSAY TEST FOR BLOOD FECAL: CPT

## 2025-06-21 LAB — HEMOCCULT STL QL IA: NEGATIVE

## 2025-06-23 ENCOUNTER — RESULTS FOLLOW-UP (OUTPATIENT)
Age: 45
End: 2025-06-23

## 2025-06-24 ENCOUNTER — OFFICE VISIT (OUTPATIENT)
Age: 45
End: 2025-06-24

## 2025-06-24 VITALS
OXYGEN SATURATION: 98 % | SYSTOLIC BLOOD PRESSURE: 104 MMHG | BODY MASS INDEX: 21.41 KG/M2 | HEIGHT: 59 IN | DIASTOLIC BLOOD PRESSURE: 68 MMHG | RESPIRATION RATE: 16 BRPM | HEART RATE: 80 BPM | WEIGHT: 106.2 LBS

## 2025-06-24 DIAGNOSIS — R13.19 ESOPHAGEAL DYSPHAGIA: ICD-10-CM

## 2025-06-24 DIAGNOSIS — H90.3 ASYMMETRIC SNHL (SENSORINEURAL HEARING LOSS): Primary | ICD-10-CM

## 2025-06-24 DIAGNOSIS — H93.11 RIGHT-SIDED TINNITUS: ICD-10-CM

## 2025-06-24 PROCEDURE — 99203 OFFICE O/P NEW LOW 30 MIN: CPT | Performed by: OTOLARYNGOLOGY

## 2025-06-24 NOTE — CONSULTS
Session ID: [id number]  Session Duration: 20 minutes  Language: Citizen of [place]   ID: #[id number]   Name: Mateo

## 2025-06-24 NOTE — PROGRESS NOTES
Otolaryngology-Head and Neck Surgery  New Patient Visit     Patient: Nikki Joya  YOB: 1980  MRN: [id number]  Date of Service: 6/24/2025    Chief Complaint:   Chief Complaint   Patient presents with    New Patient    Tinnitus         History of Present Illness: Nikki Joya is a 45 y.o. female who presents today for discussion of her ears    Describes reduced hearing in the right ear, ongoing for several years  Has R sided tinnitus as well  Constant    No prior ear surgery  ? Trauma several years ago - but this was more related to her neck, not clearly related to ear symptoms    Of note, per chart review, has dysphagia with barium swallow demonstrating concern for esophageal mass. She has not been able to get in to see GI.       Past Medical History:  Past Medical History:   Diagnosis Date    Asthma     Back pain     GERD (gastroesophageal reflux disease)        Past Surgical History:   Past Surgical History:   Procedure Laterality Date    BACK SURGERY N/A 5/19/2025    EXCISION BACK NECK LIPOMA (MAC/LOCAL) performed by Zo Golden MD at [facility] MAIN OR       Medications:   Current Outpatient Medications   Medication Instructions    albuterol sulfate HFA (VENTOLIN HFA) 108 (90 Base) MCG/ACT inhaler 2 puffs, Inhalation, EVERY 6 HOURS PRN    ibuprofen (ADVIL;MOTRIN) 800 mg, EVERY 6 HOURS PRN    traZODone (DESYREL) 50 mg, Oral, NIGHTLY PRN       Allergies:   Allergies   Allergen Reactions    Penicillins Rash       Social History:   Social History     Tobacco Use    Smoking status: Never     Passive exposure: Never    Smokeless tobacco: Never   Substance Use Topics    Alcohol use: Never    Drug use: Never        Family History:  History reviewed. No pertinent family history.    Review of Systems:    Consitutional: denies fever, excessive weight gain or loss.  Eyes: denies diplopia, eye pain.  Integumentary: denies new concerning skin lesions.  Ears, Nose, Mouth, Throat:

## 2025-06-24 NOTE — RESULT ENCOUNTER NOTE
A result letter has been created and mailed to the patient in regards to an Occult Blood Stool Immunoassay test.

## 2025-06-25 ENCOUNTER — HOSPITAL ENCOUNTER (OUTPATIENT)
Facility: HOSPITAL | Age: 45
Discharge: HOME | End: 2025-06-28
Attending: FAMILY MEDICINE

## 2025-06-25 VITALS — HEIGHT: 59 IN | BODY MASS INDEX: 21.37 KG/M2 | WEIGHT: 106 LBS

## 2025-06-25 DIAGNOSIS — Z12.31 BREAST CANCER SCREENING BY MAMMOGRAM: ICD-10-CM

## 2025-06-25 PROCEDURE — 77067 SCR MAMMO BI INCL CAD: CPT

## 2025-07-15 ENCOUNTER — HOSPITAL ENCOUNTER (OUTPATIENT)
Facility: HOSPITAL | Age: 45
Setting detail: SPECIMEN
Discharge: HOME | End: 2025-07-18

## 2025-07-15 ENCOUNTER — OFFICE VISIT (OUTPATIENT)
Age: 45
End: 2025-07-15

## 2025-07-15 VITALS
HEART RATE: 74 BPM | DIASTOLIC BLOOD PRESSURE: 60 MMHG | OXYGEN SATURATION: 95 % | WEIGHT: 106 LBS | SYSTOLIC BLOOD PRESSURE: 102 MMHG | BODY MASS INDEX: 21.41 KG/M2 | TEMPERATURE: 97.9 F

## 2025-07-15 DIAGNOSIS — Z01.419 ENCOUNTER FOR WELL WOMAN EXAM: Primary | ICD-10-CM

## 2025-07-15 PROCEDURE — 87624 HPV HI-RISK TYP POOLED RSLT: CPT

## 2025-07-15 PROCEDURE — 88175 CYTOPATH C/V AUTO FLUID REDO: CPT

## 2025-07-15 PROCEDURE — 99396 PREV VISIT EST AGE 40-64: CPT | Performed by: PHYSICIAN ASSISTANT

## 2025-07-15 ASSESSMENT — PATIENT HEALTH QUESTIONNAIRE - PHQ9
SUM OF ALL RESPONSES TO PHQ QUESTIONS 1-9: 1
2. FEELING DOWN, DEPRESSED OR HOPELESS: SEVERAL DAYS
SUM OF ALL RESPONSES TO PHQ QUESTIONS 1-9: 1
SUM OF ALL RESPONSES TO PHQ QUESTIONS 1-9: 1
1. LITTLE INTEREST OR PLEASURE IN DOING THINGS: NOT AT ALL
SUM OF ALL RESPONSES TO PHQ QUESTIONS 1-9: 1

## 2025-07-15 NOTE — PROGRESS NOTES
Assessment/Plan:    Nikki was seen today for gynecologic exam.    Diagnoses and all orders for this visit:    Encounter for well woman exam  -     PAP IG, Aptima HPV and rfx 16/18,45 (); Future        No follow-up provider specified.    Kalina Baker PA-C  Nikki Ramin Joya expressed understanding of this plan. An AVS was printed and given to the patient.      ----------------------------------------------------------------------    Chief Complaint   Patient presents with    Gynecologic Exam       History of Present Illness:  Pt presents for well woman exam   2SAB,  for birth  Having normal monthly periods. Not having menopausal sxs  No risk for DV  Had a mammo in the past few months- 3D recommended next year, normal  Had FIT test, negative. This should be repeated on an annual basis  Recently remarried.       Past Medical History:   Diagnosis Date    Asthma     Back pain     GERD (gastroesophageal reflux disease)        Current Outpatient Medications   Medication Sig Dispense Refill    ibuprofen (ADVIL;MOTRIN) 800 MG tablet Take 1 tablet by mouth every 6 hours as needed for Pain      traZODone (DESYREL) 50 MG tablet Take 1 tablet by mouth nightly as needed for Sleep 30 tablet 2    albuterol sulfate HFA (VENTOLIN HFA) 108 (90 Base) MCG/ACT inhaler Inhale 2 puffs into the lungs every 6 hours as needed for Wheezing or Shortness of Breath 18 g 1     No current facility-administered medications for this visit.       Allergies   Allergen Reactions    Penicillins Rash       Social History     Tobacco Use    Smoking status: Never     Passive exposure: Never    Smokeless tobacco: Never   Substance Use Topics    Alcohol use: Never    Drug use: Never       No family history on file.    Physical Exam:     /60   Pulse 74   Temp 97.9 °F (36.6 °C) (Temporal)   Wt 48.1 kg (106 lb)   LMP 2025 (Approximate)   SpO2 95%   BMI 21.41 kg/m²     A&Ox3  WDWN NAD  Respirations normal and non

## 2025-07-15 NOTE — PATIENT INSTRUCTIONS
Please let her know that her mammo was normal. She should go to [facility] for next mammo, please give her info.   MELANIE

## 2025-07-15 NOTE — PROGRESS NOTES
Pt's name and  verified. AVS provided. Pt instructed to schedule next exam in 1 year per provider. Time allowed for questions, no questions at this time. Adamaris Valenzuela RN

## 2025-07-15 NOTE — PROGRESS NOTES
[facility]    When was your last pap smear and where? 2023 [facility]    Any abnormal results from previous pap smears? no    Any problems with your breasts? no    Any family history of breast/ovarian cancer? no    Do you have any kids? yes    Oldest 24 youngest 0    Are you sexually active? yes    Are you using any type of birth control? If yes where do you go for this? no    Abuse screening completed this visit? yes

## 2025-07-17 LAB — HPV I/H RISK 1 DNA CVX QL PROBE+SIG AMP: NEGATIVE

## 2025-07-23 ENCOUNTER — RESULTS FOLLOW-UP (OUTPATIENT)
Age: 45
End: 2025-07-23

## 2025-09-02 ENCOUNTER — ANESTHESIA (OUTPATIENT)
Facility: HOSPITAL | Age: 45
End: 2025-09-02

## 2025-09-02 ENCOUNTER — ANESTHESIA EVENT (OUTPATIENT)
Facility: HOSPITAL | Age: 45
End: 2025-09-02

## 2025-09-02 ENCOUNTER — HOSPITAL ENCOUNTER (OUTPATIENT)
Facility: HOSPITAL | Age: 45
Setting detail: OUTPATIENT SURGERY
Discharge: HOME | End: 2025-09-02
Attending: STUDENT IN AN ORGANIZED HEALTH CARE EDUCATION/TRAINING PROGRAM | Admitting: STUDENT IN AN ORGANIZED HEALTH CARE EDUCATION/TRAINING PROGRAM

## 2025-09-02 VITALS
HEART RATE: 78 BPM | HEIGHT: 59 IN | WEIGHT: 104.2 LBS | TEMPERATURE: 97.6 F | RESPIRATION RATE: 18 BRPM | SYSTOLIC BLOOD PRESSURE: 118 MMHG | BODY MASS INDEX: 21 KG/M2 | DIASTOLIC BLOOD PRESSURE: 76 MMHG | OXYGEN SATURATION: 100 %

## 2025-09-02 LAB — HCG UR QL: NEGATIVE

## 2025-09-02 PROCEDURE — 88342 IMHCHEM/IMCYTCHM 1ST ANTB: CPT

## 2025-09-02 PROCEDURE — 3700000000 HC ANESTHESIA ATTENDED CARE: Performed by: STUDENT IN AN ORGANIZED HEALTH CARE EDUCATION/TRAINING PROGRAM

## 2025-09-02 PROCEDURE — 2580000003 HC RX 258: Performed by: STUDENT IN AN ORGANIZED HEALTH CARE EDUCATION/TRAINING PROGRAM

## 2025-09-02 PROCEDURE — 3600007502: Performed by: STUDENT IN AN ORGANIZED HEALTH CARE EDUCATION/TRAINING PROGRAM

## 2025-09-02 PROCEDURE — 7100000010 HC PHASE II RECOVERY - FIRST 15 MIN: Performed by: STUDENT IN AN ORGANIZED HEALTH CARE EDUCATION/TRAINING PROGRAM

## 2025-09-02 PROCEDURE — 3700000001 HC ADD 15 MINUTES (ANESTHESIA): Performed by: STUDENT IN AN ORGANIZED HEALTH CARE EDUCATION/TRAINING PROGRAM

## 2025-09-02 PROCEDURE — 88305 TISSUE EXAM BY PATHOLOGIST: CPT

## 2025-09-02 PROCEDURE — 6360000002 HC RX W HCPCS

## 2025-09-02 PROCEDURE — 7100000011 HC PHASE II RECOVERY - ADDTL 15 MIN: Performed by: STUDENT IN AN ORGANIZED HEALTH CARE EDUCATION/TRAINING PROGRAM

## 2025-09-02 PROCEDURE — 81025 URINE PREGNANCY TEST: CPT

## 2025-09-02 PROCEDURE — 2709999900 HC NON-CHARGEABLE SUPPLY: Performed by: STUDENT IN AN ORGANIZED HEALTH CARE EDUCATION/TRAINING PROGRAM

## 2025-09-02 PROCEDURE — 3600007512: Performed by: STUDENT IN AN ORGANIZED HEALTH CARE EDUCATION/TRAINING PROGRAM

## 2025-09-02 RX ORDER — SODIUM CHLORIDE 0.9 % (FLUSH) 0.9 %
5-40 SYRINGE (ML) INJECTION EVERY 12 HOURS SCHEDULED
Status: DISCONTINUED | OUTPATIENT
Start: 2025-09-02 | End: 2025-09-02 | Stop reason: HOSPADM

## 2025-09-02 RX ORDER — LIDOCAINE HYDROCHLORIDE 20 MG/ML
INJECTION, SOLUTION EPIDURAL; INFILTRATION; INTRACAUDAL; PERINEURAL
Status: DISCONTINUED | OUTPATIENT
Start: 2025-09-02 | End: 2025-09-02 | Stop reason: SDUPTHER

## 2025-09-02 RX ORDER — SODIUM CHLORIDE 0.9 % (FLUSH) 0.9 %
5-40 SYRINGE (ML) INJECTION PRN
Status: DISCONTINUED | OUTPATIENT
Start: 2025-09-02 | End: 2025-09-02 | Stop reason: HOSPADM

## 2025-09-02 RX ORDER — SODIUM CHLORIDE 9 MG/ML
25 INJECTION, SOLUTION INTRAVENOUS PRN
Status: DISCONTINUED | OUTPATIENT
Start: 2025-09-02 | End: 2025-09-02 | Stop reason: HOSPADM

## 2025-09-02 RX ADMIN — LIDOCAINE HYDROCHLORIDE 50 MG: 20 INJECTION, SOLUTION EPIDURAL; INFILTRATION; INTRACAUDAL; PERINEURAL at 13:49

## 2025-09-02 RX ADMIN — PROPOFOL 50 MG: 10 INJECTION, EMULSION INTRAVENOUS at 14:04

## 2025-09-02 RX ADMIN — PROPOFOL 50 MG: 10 INJECTION, EMULSION INTRAVENOUS at 14:07

## 2025-09-02 RX ADMIN — PROPOFOL 50 MG: 10 INJECTION, EMULSION INTRAVENOUS at 13:56

## 2025-09-02 RX ADMIN — PROPOFOL 50 MG: 10 INJECTION, EMULSION INTRAVENOUS at 14:14

## 2025-09-02 RX ADMIN — PROPOFOL 50 MG: 10 INJECTION, EMULSION INTRAVENOUS at 14:10

## 2025-09-02 RX ADMIN — PROPOFOL 50 MG: 10 INJECTION, EMULSION INTRAVENOUS at 13:53

## 2025-09-02 RX ADMIN — PROPOFOL 50 MG: 10 INJECTION, EMULSION INTRAVENOUS at 14:00

## 2025-09-02 RX ADMIN — SODIUM CHLORIDE: 9 INJECTION, SOLUTION INTRAVENOUS at 13:57

## 2025-09-02 RX ADMIN — PROPOFOL 50 MG: 10 INJECTION, EMULSION INTRAVENOUS at 13:51

## 2025-09-02 RX ADMIN — SODIUM CHLORIDE: 9 INJECTION, SOLUTION INTRAVENOUS at 13:47

## 2025-09-02 RX ADMIN — PROPOFOL 100 MG: 10 INJECTION, EMULSION INTRAVENOUS at 13:49

## 2025-09-02 RX ADMIN — PROPOFOL 50 MG: 10 INJECTION, EMULSION INTRAVENOUS at 14:19

## 2025-09-02 RX ADMIN — PROPOFOL 50 MG: 10 INJECTION, EMULSION INTRAVENOUS at 13:55

## 2025-09-02 RX ADMIN — PROPOFOL 50 MG: 10 INJECTION, EMULSION INTRAVENOUS at 13:58

## 2025-09-02 ASSESSMENT — PAIN - FUNCTIONAL ASSESSMENT
PAIN_FUNCTIONAL_ASSESSMENT: 0-10

## (undated) DEVICE — DRAPE,LAPAROTOMY,PED,STERILE: Brand: MEDLINE

## (undated) DEVICE — SUTURE VICRYL + SZ 4-0 L27IN ABSRB UD PS-2 3/8 CIR REV CUT VCP426H

## (undated) DEVICE — SUTURE VICRYL + SZ 3-0 L27IN ABSRB UD L26MM SH 1/2 CIR VCP416H

## (undated) DEVICE — BASIC GENERAL-SFMC: Brand: MEDLINE INDUSTRIES, INC.

## (undated) DEVICE — DRAPE,REIN 53X77,STERILE: Brand: MEDLINE

## (undated) DEVICE — SYRINGE MED 10ML LUERLOCK TIP W/O SFTY DISP

## (undated) DEVICE — LIQUIBAND RAPID ADHESIVE 36/CS 0.8ML: Brand: MEDLINE

## (undated) DEVICE — FORCEP BX JUMBO 4 2.8 MMX240 CM 3.2 MM OVL CUP RADIAL JAW

## (undated) DEVICE — CANISTER, RIGID, 3000CC: Brand: MEDLINE INDUSTRIES, INC.

## (undated) DEVICE — TRANSFER SET 3": Brand: MEDLINE INDUSTRIES, INC.

## (undated) DEVICE — SOLUTION IV 500ML 0.9% SOD BOTTLE CHL LTWT DURABLE SHATTERPROOF

## (undated) DEVICE — SOLUTION IRRIG 1000ML H2O PIC PLAS SHATTERPROOF CONTAINER

## (undated) DEVICE — GLOVE SURG SZ 6 THK91MIL LTX FREE SYN POLYISOPRENE ANTI

## (undated) DEVICE — TUBING SUCT 10FR MAL ALUM SHFT FN CAP VENT UNIV CONN W/ OBT

## (undated) DEVICE — TOWEL,OR,DSP,ST,BLUE,STD,4/PK,20PK/CS: Brand: MEDLINE